# Patient Record
Sex: FEMALE | Race: OTHER | Employment: UNEMPLOYED | URBAN - METROPOLITAN AREA
[De-identification: names, ages, dates, MRNs, and addresses within clinical notes are randomized per-mention and may not be internally consistent; named-entity substitution may affect disease eponyms.]

---

## 2017-10-16 ENCOUNTER — HOSPITAL ENCOUNTER (EMERGENCY)
Age: 32
Discharge: HOME | End: 2017-10-16
Attending: EMERGENCY MEDICINE | Admitting: EMERGENCY MEDICINE
Payer: SELF-PAY

## 2017-10-16 VITALS
RESPIRATION RATE: 20 BRPM | HEIGHT: 63 IN | WEIGHT: 160 LBS | BODY MASS INDEX: 28.35 KG/M2 | SYSTOLIC BLOOD PRESSURE: 139 MMHG | DIASTOLIC BLOOD PRESSURE: 75 MMHG | HEART RATE: 62 BPM | OXYGEN SATURATION: 100 % | TEMPERATURE: 98 F

## 2017-10-16 DIAGNOSIS — B37.31 VAGINAL CANDIDIASIS: Primary | ICD-10-CM

## 2017-10-16 PROCEDURE — 99282 EMERGENCY DEPT VISIT SF MDM: CPT

## 2017-10-16 RX ORDER — FLUCONAZOLE 150 MG/1
TABLET ORAL
Qty: 3 TAB | Refills: 0 | Status: SHIPPED | OUTPATIENT
Start: 2017-10-16

## 2017-10-17 NOTE — DISCHARGE INSTRUCTIONS
Candidiasis: Instrucciones de cuidado - [ Candidiasis: Care Instructions ]  Instrucciones de cuidado  La candidiasis es fan infección causada por el hongo en forma de levadura del tipo cándida. Por lo general, los hongos cándida viven en magdaleno cuerpo. Sin embargo, pueden causar problemas si las defensas del organismo no funcionan beth deberían. Algunos medicamentos pueden aumentar marcia probabilidades de contraer fan infección por cándida. Estos incluyen los antibióticos, los esteroides y los medicamentos para el cáncer. [address] y la diabetes pueden hacer que usted tenga infecciones por cándida con mayor facilidad. Hay varios tipos de infecciones por hongos en forma de levadura (cándida). Las aftas (candidosis bucal) es fan infección en la boca causada por la cándida. Suele ocurrir en personas cuyo sistema inmunitario es débil. Provoca manchas jim en el interior de la boca y la garganta. Las infecciones cutáneas por cándida suelen ocurrir en los pliegues de piel donde esta se [place]. Provocan la aparición de manchas rojizas con secreción en la piel. Los bebés pueden contraer estas infecciones bajo el pañal. Las personas que utilizan guantes a menudo pueden tenerlas en las erendira. Muchas mujeres contraen candidiasis vaginal. Es muy común cuando las mujeres shen antibióticos. Esta infección puede provocar Lili [employer] y ardor en la vagina. Igualmente puede wade lugar a un flujo similar al requesón (\"cottage cheese\"). En casos raros, la cándida [employer]. [place] puede causar [employer]. roberto tipo de infección se trata con medicamentos administrados por medio de fan inyección en fan vena (IV). Las infecciones por cándida suelen desaparecer rápidamente fan vez que se comienza el [place]. Sin embargo, si magdaleno sistema inmunitario es débil, la infección podría reaparecer. Avísele a magdaleno médico si contrae infecciones por cándida con frecuencia.   [address] seguimiento es fan parte clave de magdaleno tratamiento y seguridad. Asegúrese de hacer y acudir a todas las citas, y llame a magdaleno médico si está teniendo problemas. También es fan buena idea saber los resultados de los exámenes y mantener fan lista de los medicamentos que alayna. ¿Cómo puede cuidarse en el hogar? · [employer] medicamentos exactamente beth le fueron recetados. Llame a magdaleno médico si reddy estar teniendo un problema con magdaleno medicamento. · [place] antibióticos solo cuando se lo recomiende el médico.  · [place] yogur con [employer] Contiene fan bacteria llamada lactobacilo que podría ayudar a prevenir algunos tipos de infecciones por cándida. · Mantenga la piel limpia y seca. Use talco en las zonas húmedas. · Si utiliza cremas o supositorios para tratar la candidiasis vaginal, no use preservativos ni diafragmas. Utilice otro método anticonceptivo. · Coma fan dieta saludable y victorino ejercicio regularmente. [place] ayudará a mantener sri magdaleno sistema inmunitario. ¿Cuándo debe pedir ayuda? Llame a magdaleno médico ahora mismo o busque atención médica inmediata si:  · Tiene fiebre. · Paulla Delgado y tiene señales de fan infección vaginal o fan infección urinaria, tales beth:  ¨ Picazón intensa en la vagina. ¨ Dolor luis el acto sexual o cuando orina. ¨ Flujo vaginal anormal.  ¨ Ganas frecuentes de orinar. ¨ Tiene la orina turbia o con [employer]. Preste especial atención a los cambios en magdaleno chuckie y asegúrese de comunicarse con magdaleno médico si:  · No mejora beth se esperaba. ¿Dónde puede encontrar más información en inglés? Jose Prado a http://claudio-ion.info/. David Killian J833 en la búsqueda para aprender más acerca de \"Candidiasis: Instrucciones de cuidado - [ Candidiasis: Care Instructions ]. \"  Revisado: 13 octubre, 2016  Versión del contenido: 11.3  © 3957-9401 [employer].  Las instrucciones de cuidado fueron adaptadas bajo licencia por [employer] (which disclaims liability or warranty for this information). Si usted tiene [place] afección médica o sobre estas instrucciones, siempre pregunte a magdaleno profesional de chuckie. [facility] niega toda garantía o responsabilidad por magdaleno uso de esta información.

## 2017-10-17 NOTE — ED NOTES
Pt discharged home stable and ambulatory. Pain level at discharge 0. Pt discharged with spouse. Reviewed discharged instructions with patient who verbalized understanding.   Patient armband removed and shredded

## 2017-10-17 NOTE — ED PROVIDER NOTES
[address]  EMERGENCY DEPARTMENT HISTORY AND PHYSICAL EXAM       Date: 10/16/2017   Patient Name: Raquel Simmons   YOB: 1985  Medical Record Number: [id number]    History of Presenting Illness     Chief Complaint   Patient presents with    Rash        History Provided By:  patient    Additional History: 8:07 PM  Raquel Simmons is a 27 y.o. female who presents to the emergency department C/O itchy rash to perineal and vaginal area onset 2 weeks ago with associated vaginal irritation. Recently returned from [facility]. Reports she had tried OTC cream/medication without relief. Hx of yeast infections, states this feels the same. Pt denies abnormal malodorous vaginal discharge, vaginal bleeding, fever, chills, any other Sx or complaints.  translated Setswana for patient. Primary Care Provider: Maynor Camara MD   Specialist:    Past History     Past Medical History:   No past medical history on file. Past Surgical History:   No past surgical history on file. Family History:   No family history on file. Social History:   Social History   Substance Use Topics    Smoking status: Not on file    Smokeless tobacco: Not on file    Alcohol use Not on file        Allergies:   No Known Allergies     Review of Systems   Review of Systems   Constitutional: Negative for chills and fever. Genitourinary: Positive for vaginal pain (irritation). Negative for vaginal bleeding and vaginal discharge. Skin: Positive for rash. All other systems reviewed and are negative. Physical Exam  Vitals:    10/16/17 1933   BP: 139/75   Pulse: 62   Resp: 20   Temp: 98 °F (36.7 °C)   SpO2: 100%   Weight: 72.6 kg (160 lb)   Height: 5' 2.99\" (1.6 m)       Physical Exam   Constitutional: She is oriented to person, place, and time. Vital signs are normal. She appears well-developed and well-nourished. No distress. HENT:   Head: Normocephalic and atraumatic.    Eyes: Conjunctivae are normal.   Neck: Normal range of motion. Neck supple. Cardiovascular: Normal rate, regular rhythm and normal heart sounds. Pulmonary/Chest: Effort normal and breath sounds normal. No respiratory distress. Abdominal: Soft. Bowel sounds are normal.   Genitourinary:   Genitourinary Comments: (declined pelvic exam)   Musculoskeletal: Normal range of motion. Neurological: She is alert and oriented to person, place, and time. Skin: Skin is warm and dry. She is not diaphoretic. Psychiatric: She has a normal mood and affect. Her behavior is normal.   Nursing note and vitals reviewed. Diagnostic Study Results     Labs -    No results found for this or any previous visit (from the past 12 hour(s)). Radiologic Studies -  The following have been ordered and reviewed:  No orders to display           Medical Decision Making   I am the first provider for this patient. I reviewed the vital signs, available nursing notes, past medical history, past surgical history, family history and social history. Vital Signs-Reviewed the patient's vital signs. Patient Vitals for the past 12 hrs:   Temp Pulse Resp BP SpO2   10/16/17 1933 98 °F (36.7 °C) 62 20 139/75 100 %       Pulse Oximetry Analysis - Normal 100% on room air     Procedures:   Procedures    ED Course:  8:07 PM  Initial assessment performed. The patients presenting problems have been discussed, and they are in agreement with the care plan formulated and outlined with them. I have encouraged them to ask questions as they arise throughout their visit. 8:16 PM Pt declines pelvic exam at this time. She solely wants to be treated for yeast infection. Medications Given in the ED:  Medications - No data to display    Discharge Note:  8:20 PM  Pt has been reexamined. Patient has no new complaints, changes, or physical findings. Care plan outlined and precautions discussed. Results were reviewed with the patient.  All medications were reviewed with the patient; will d/c home with Diflucan. All of pt's questions and concerns were addressed. Patient was instructed and agrees to follow up with [facility], as well as to return to the ED upon further deterioration. Patient is ready to go home. Diagnosis   Clinical Impression:   1. Vaginal candidiasis           Follow-up Information     Follow up With Details Comments Contact Info    [facility] Schedule an appointment as soon as possible for a visit For follow up with [facility] [phone]    [facility] EMERGENCY DEPT Go to As needed, if symptoms worsen [address] [address]  [phone]          Current Discharge Medication List      START taking these medications    Details   fluconazole (DIFLUCAN) 150 mg tablet Take 1 tab by mouth every other day for 3 total doses (M, W, F). Indications: vaginal candidiasis  Qty: 3 Tab, Refills: 0             _______________________________   Attestations: This note is prepared by Vasquez Vazquez, acting as a Scribe for [employer], PA-C on 8:06 PM on 10/16/2017. [employer], PA-C: The scribe's documentation has been prepared under my direction and personally reviewed by me in its entirety. I was personally available for consultation in the emergency department. I have reviewed the chart prior to the patient's discharge and agree with the documentation recorded by the [facility], including the assessment, treatment plan, and disposition. Unfortunately, refusal of pelvic minimizes the ability to narrow differential of vaginal discharge by testing.   Anusha Vaughn MD      _______________________________